# Patient Record
Sex: MALE | Race: WHITE | HISPANIC OR LATINO
[De-identification: names, ages, dates, MRNs, and addresses within clinical notes are randomized per-mention and may not be internally consistent; named-entity substitution may affect disease eponyms.]

---

## 2017-01-25 ENCOUNTER — APPOINTMENT (OUTPATIENT)
Dept: VASCULAR SURGERY | Facility: CLINIC | Age: 42
End: 2017-01-25

## 2017-01-25 VITALS — SYSTOLIC BLOOD PRESSURE: 111 MMHG | DIASTOLIC BLOOD PRESSURE: 75 MMHG | HEART RATE: 90 BPM | TEMPERATURE: 98.6 F

## 2017-01-25 DIAGNOSIS — L03.116 CELLULITIS OF LEFT LOWER LIMB: ICD-10-CM

## 2022-11-29 ENCOUNTER — NON-APPOINTMENT (OUTPATIENT)
Age: 47
End: 2022-11-29

## 2022-11-29 ENCOUNTER — APPOINTMENT (OUTPATIENT)
Dept: VASCULAR SURGERY | Facility: CLINIC | Age: 47
End: 2022-11-29

## 2022-11-29 VITALS — WEIGHT: 250 LBS | HEIGHT: 71.5 IN | BODY MASS INDEX: 34.23 KG/M2

## 2022-11-29 DIAGNOSIS — Z00.00 ENCOUNTER FOR GENERAL ADULT MEDICAL EXAMINATION W/OUT ABNORMAL FINDINGS: ICD-10-CM

## 2022-11-29 PROCEDURE — 93971 EXTREMITY STUDY: CPT

## 2022-11-29 PROCEDURE — 99204 OFFICE O/P NEW MOD 45 MIN: CPT

## 2022-11-29 RX ORDER — AMOXICILLIN AND CLAVULANATE POTASSIUM 500; 125 MG/1; MG/1
500-125 TABLET, FILM COATED ORAL
Qty: 20 | Refills: 0 | Status: DISCONTINUED | COMMUNITY
Start: 2017-01-25 | End: 2022-11-29

## 2022-12-01 NOTE — PROCEDURE
[FreeTextEntry1] : Venous ultrasound performed demonstrating greater saphenous vein status post ablation.  Enlarged and abnormal anterior accessory saphenous vein with significant reflux.  Greater saphenous vein in the calf is patent with significant reflux and multiple varicosities arising from proximal greater saphenous vein.

## 2022-12-01 NOTE — HISTORY OF PRESENT ILLNESS
[FreeTextEntry1] : 47-year-old male with past medical history of extensive left lower extremity varicose veins, edema, cellulitis.  Patient in the past underwent radiofrequency ablation of the left greater saphenous vein with a good result.  He also underwent stab phlebectomy at a time.  However he now noted over the course of last 5 years significant recurrence of his varicose veins especially localized to the anterior lateral thigh and calf.  Symptoms exacerbated by activity and standing.  Alleviated partially by elevation.  The symptoms progressed despite full compliance with nonoperative management with compression and elevation of bilateral lower extremities since previous procedures in 2017.  Patient is very active and he is on his feet a lot for his job.  The symptoms are lifestyle limiting.\par \par Review of system as per HPI.  As physical system ROS is negative.\par \par General: NAD, awake alert and oriented.\par HEENT: Normocephalic atraumatic.  Extraocular muscles intact.\par Neck: No JVD.  Normal thyroid.\par Abdomen: Soft, NT, ND. No guarding. No palpable masses. No HSM.\par : No CVA tenderness.\par Vascular: 2+ bilateral carotid, radial, femoral, popliteal, DP and PT pulses.\par Bilateral lower extremity 2+ edema, left worse than right.\par Varicose veins greater than 3 mm in diameter present bilaterally.\par Extremity: Bilateral lower extremity warm and dry.  No cyanosis.  No edema.\par Integument: No rashes or lesions\par MS: Normal muscle bulk and tone. FROM in all extremities.\par Neuro: Insert neuro\par Psych: Calm.  Appropriate affect.\par

## 2022-12-01 NOTE — ASSESSMENT
[FreeTextEntry1] : \par 47-year-old male with past medical history of extensive varicose veins now with recurrence of his symptoms despite full compliance with nonoperative management with compression, elevation, exercise therapy for last 5 years.  We discussed treatment options.  Patient would like to proceed with definitive management.  He is a good candidate for radiofrequency ablation of left anterior saphenous vein and Varithena ablation of the residual greater saphenous vein in the calf.  He will continue leg compression and elevation.  We will proceed with surgical intervention . \par \par Time spent 48 minutes [Arterial/Venous Disease] : arterial/venous disease

## 2023-05-12 ENCOUNTER — APPOINTMENT (OUTPATIENT)
Dept: VASCULAR SURGERY | Facility: CLINIC | Age: 48
End: 2023-05-12
Payer: COMMERCIAL

## 2023-05-12 PROCEDURE — 93970 EXTREMITY STUDY: CPT

## 2023-08-18 ENCOUNTER — APPOINTMENT (OUTPATIENT)
Dept: VASCULAR SURGERY | Facility: CLINIC | Age: 48
End: 2023-08-18
Payer: COMMERCIAL

## 2023-08-18 VITALS — SYSTOLIC BLOOD PRESSURE: 142 MMHG | DIASTOLIC BLOOD PRESSURE: 93 MMHG | HEART RATE: 123 BPM

## 2023-08-18 PROCEDURE — 36475 ENDOVENOUS RF 1ST VEIN: CPT

## 2023-08-18 NOTE — PROCEDURE
[FreeTextEntry1] : Left anterior accessory vein  Ablation [FreeTextEntry2] : Venous insufficiency and reflux.  Symptomatic varicose veins. [FreeTextEntry3] : The patient was seen in the waiting room where the consent was obtained.  Then the patient was taken to the procedure room where a timeout was called verifying patient's identity and the laterality of the procedure. The patient's left leg was  interrogated under ultrasound. An appropriate place for cannulation was identified. The leg  was  prepped  with chloroprep and draped in the standard fashion. Under ultrasound guidance  1% plain lidocaine was injected  in the venous access site. Access was then obtained with a 7 FR sheath in the standard fashion using a micropuncture technique. Inner dilator was removed. The sheath was irrigated. RFA catheter was placed to the SFJ and withdrawn 3.0 cm from the junction.  Tumescence was administered around the saphenous vein under ultrasound guidance. The ablation was  performed Using a total of 6 cycles. The catheter and sheath were withdrawn. Complete hemostasis was achieved with manual compression. Steri strips were applied to catheter insertion site. Leg was wrapped in Kerlix and an ace wrap. Patient tolerated the procedure well and verbalized understanding of post-procedure instructions. The patient was discharged in stable condition.

## 2023-08-29 ENCOUNTER — APPOINTMENT (OUTPATIENT)
Dept: VASCULAR SURGERY | Facility: CLINIC | Age: 48
End: 2023-08-29
Payer: COMMERCIAL

## 2023-08-29 PROCEDURE — 93971 EXTREMITY STUDY: CPT

## 2023-09-29 ENCOUNTER — APPOINTMENT (OUTPATIENT)
Dept: VASCULAR SURGERY | Facility: CLINIC | Age: 48
End: 2023-09-29
Payer: COMMERCIAL

## 2023-09-29 VITALS — HEIGHT: 71.5 IN

## 2023-09-29 DIAGNOSIS — M79.89 OTHER SPECIFIED SOFT TISSUE DISORDERS: ICD-10-CM

## 2023-09-29 DIAGNOSIS — I87.2 VENOUS INSUFFICIENCY (CHRONIC) (PERIPHERAL): ICD-10-CM

## 2023-09-29 PROCEDURE — 99214 OFFICE O/P EST MOD 30 MIN: CPT

## 2023-09-30 PROBLEM — I87.2 CHRONIC VENOUS INSUFFICIENCY: Status: ACTIVE | Noted: 2023-08-18

## 2023-10-16 ENCOUNTER — TRANSCRIPTION ENCOUNTER (OUTPATIENT)
Age: 48
End: 2023-10-16

## 2023-10-16 ENCOUNTER — APPOINTMENT (OUTPATIENT)
Dept: VASCULAR SURGERY | Facility: HOSPITAL | Age: 48
End: 2023-10-16

## 2023-10-18 RX ORDER — ACETAMINOPHEN AND CODEINE PHOSPHATE 300; 30 MG/1; MG/1
300-30 TABLET ORAL
Qty: 20 | Refills: 0 | Status: ACTIVE | COMMUNITY
Start: 2023-10-18 | End: 1900-01-01

## 2023-10-20 ENCOUNTER — APPOINTMENT (OUTPATIENT)
Dept: VASCULAR SURGERY | Facility: CLINIC | Age: 48
End: 2023-10-20
Payer: COMMERCIAL

## 2023-10-20 VITALS
BODY MASS INDEX: 35 KG/M2 | HEART RATE: 85 BPM | SYSTOLIC BLOOD PRESSURE: 125 MMHG | DIASTOLIC BLOOD PRESSURE: 85 MMHG | OXYGEN SATURATION: 97 % | HEIGHT: 71 IN | WEIGHT: 250 LBS

## 2023-10-20 DIAGNOSIS — I83.93 ASYMPTOMATIC VARICOSE VEINS OF BILATERAL LOWER EXTREMITIES: ICD-10-CM

## 2023-10-20 PROCEDURE — 99024 POSTOP FOLLOW-UP VISIT: CPT
